# Patient Record
Sex: MALE | Race: WHITE | Employment: PART TIME | URBAN - METROPOLITAN AREA
[De-identification: names, ages, dates, MRNs, and addresses within clinical notes are randomized per-mention and may not be internally consistent; named-entity substitution may affect disease eponyms.]

---

## 2017-01-10 ENCOUNTER — OFFICE VISIT (OUTPATIENT)
Dept: INTERNAL MEDICINE | Facility: CLINIC | Age: 22
End: 2017-01-10
Payer: COMMERCIAL

## 2017-01-10 VITALS
TEMPERATURE: 97.2 F | HEART RATE: 64 BPM | HEIGHT: 73 IN | DIASTOLIC BLOOD PRESSURE: 70 MMHG | OXYGEN SATURATION: 99 % | BODY MASS INDEX: 23.68 KG/M2 | WEIGHT: 178.7 LBS | SYSTOLIC BLOOD PRESSURE: 100 MMHG

## 2017-01-10 DIAGNOSIS — G43.009 ATYPICAL MIGRAINE: Primary | ICD-10-CM

## 2017-01-10 PROCEDURE — 99203 OFFICE O/P NEW LOW 30 MIN: CPT | Performed by: INTERNAL MEDICINE

## 2017-01-10 RX ORDER — SUMATRIPTAN 50 MG/1
50 TABLET, FILM COATED ORAL
Qty: 18 TABLET | Refills: 3 | Status: SHIPPED | OUTPATIENT
Start: 2017-01-10 | End: 2017-01-10

## 2017-01-10 RX ORDER — SUMATRIPTAN 50 MG/1
50 TABLET, FILM COATED ORAL
Qty: 18 TABLET | Refills: 3 | Status: SHIPPED | OUTPATIENT
Start: 2017-01-10

## 2017-01-10 RX ORDER — TOPIRAMATE 100 MG/1
100 TABLET, FILM COATED ORAL AT BEDTIME
Qty: 60 TABLET | Refills: 1 | Status: SHIPPED | OUTPATIENT
Start: 2017-01-10

## 2017-01-10 RX ORDER — VERAPAMIL HYDROCHLORIDE 180 MG/1
180 TABLET, EXTENDED RELEASE ORAL DAILY
COMMUNITY
Start: 2017-01-10

## 2017-01-10 NOTE — PATIENT INSTRUCTIONS
I have placed multiple neurology referrals for you. See who can get you is ASAP.    Please schedule an appointment at your earliest convenience - phone numbers below.    --    Stop Verapamil.    START Topamax (topiramate): 1/2 tab at night for next 2 nights, then increase to full tab at night.    Ideally would take twice a day (100mg tabs) - but may cause grogginess.     ---    Imitrex (sumitriptan) for abortive treatment:    Start as soon as twinge of headache starts. If no improvement in 30 minutes, take a second pill. No more than 2 tablets in 24 hours.

## 2017-01-10 NOTE — PROGRESS NOTES
"  SUBJECTIVE:                                                      HPI: Braulio Butler is a pleasant 21 year old male who presents with migraines:    Accompanied by father.    - has history of atypical migraines    - originally diagnosed with this by neurologist at [facility] several years ago   - started on cyproheptadine - thinks it helped, but has not been on it for years    - migraines seem to be worsening over time  - occur in bouts: daily for 2-3 weeks, then migraine-less for extended period of time (weeks)  - describes migraines as:   - no aura   - frontal in location   - throbbing in quality   - moderate in severity - 5/10   - associated nausea, light-headedness, and dizziness    - occasional vomiting    - describes dizziness as difficulty balance   - no photo- and phono-phobia   - no sinus congestion, rhinitis, eye pain, conjunctival injection, or tearing   - resolve after napping or resting   - minimal/no relief with ibuprofen or Excedrin    - was started on Verapamil ER 120mg at    - noted mild improvement --> dose increased to 180mg ER    - noted mild improvement, but still getting daily migraines    - wears contacts/glasses - last check 5/16    Congenital left-sided hearing loss, otherwise no other active medical problems.  No illicit drug use. Alcohol use on weekends only.   Maternal aunt with migraines.    The medication, allergy, and problem lists have been reviewed and updated as appropriate.     OBJECTIVE:                                                      /70 mmHg  Pulse 64  Temp(Src) 97.2  F (36.2  C) (Oral)  Ht 6' 0.5\" (1.842 m)  Wt 178 lb 11.2 oz (81.058 kg)  BMI 23.89 kg/m2  SpO2 99%  Constitutional: well-appearing  Psych: normal judgment and insight; normal mood and affect; recent and remote memory intact    ASSESSMENT/PLAN:                                                      (G43.009) Atypical migraine  (primary encounter diagnosis)  Comment:    - only mild " improvement with Verapamil - patient is interested in different prophylactic medication.   - recommend trial of abortive therapy as well.   Plan:    - STOP Verapamil.   - START Topamax 50mg qhs --> 100mg qhs --> 100mg bid (if tolerated).   - Imitrex PRN for abortive therapy.   - referred to neurology for further evaluation and management.    - if unable to see neurology, follow-up with me in 4-6 weeks.     The instructions on the AVS were discussed and explained to the patient. Patient expressed understanding of instructions.    Marilyn Beaulieu MD   [address] [address]  T: [phone], F: [phone]

## 2017-01-10 NOTE — MR AVS SNAPSHOT
After Visit Summary   1/10/2017    Braulio Butler    MRN: [id number]           Patient Information     Date Of Birth          1995        Visit Information        Provider Department      1/10/2017 3:00 PM Marilyn Beaulieu MD [facility]        Today's Diagnoses     Atypical migraine    -  1       Care Instructions    I have placed multiple neurology referrals for you. See who can get you is ASAP.    Please schedule an appointment at your earliest convenience - phone numbers below.    --    Stop Verapamil.    START Topamax (topiramate): 1/2 tab at night for next 2 nights, then increase to full tab at night.    Ideally would take twice a day (100mg tabs) - but may cause grogginess.     ---    Imitrex (sumitriptan) for abortive treatment:    Start as soon as twinge of headache starts. If no improvement in 30 minutes, take a second pill. No more than 2 tablets in 24 hours.                Follow-ups after your visit        Additional Services     NEUROLOGY ADULT REFERRAL       Your provider has referred you to: [facility] [place]: [facility] [place] [facility] [phone]   [email or web address]  N: [facility] [phone]   [email or web address]  N: [facility] [phone]   [email or web address]    Reason for Referral: Consult    Please be aware that coverage of these services is subject to the terms and limitations of your health insurance plan.  Call member services at your health plan with any benefit or coverage questions.      Please bring the following with you to your appointment:    (1) Any X-Rays, CTs or MRIs which have been performed.  Contact the facility where they were done to arrange for  prior to your scheduled appointment.    (2) List of current medications  (3) This referral request   (4) Any  "documents/labs given to you for this referral                  Who to contact     If you have questions or need follow up information about today's clinic visit or your schedule please contact [facility] directly at [phone].  Normal or non-critical lab and imaging results will be communicated to you by MyChart, letter or phone within 4 business days after the clinic has received the results. If you do not hear from us within 7 days, please contact the clinic through [facility] or phone. If you have a critical or abnormal lab result, we will notify you by phone as soon as possible.  Submit refill requests through [facility] [employer] or call your pharmacy and they will forward the refill request to us. Please allow 3 business days for your refill to be completed.          Additional Information About Your Visit        [facility] Information     [employer] lets you send messages to your doctor, view your test results, renew your prescriptions, schedule appointments and more. To sign up, go to [email or web address] . Click on \"Log in\" on the left side of the screen, which will take you to the Welcome page. Then click on \"Sign up Now\" on the right side of the page.     You will be asked to enter the access code listed below, as well as some personal information. Please follow the directions to create your username and password.     Your access code is: [email or web address]  Expires: 4/10/2017  3:35 PM     Your access code will  in 90 days. If you need help or a new code, please call your [facility] clinic or [phone].        Care EveryWhere ID     This is your Care EveryWhere ID. This could be used by other organizations to access your [facility] medical records  [id number]        Your Vitals Were     Pulse Temperature Height BMI (Body Mass Index) Pulse Oximetry       64 97.2  F (36.2  C) (Oral) 6' 0.5\" (1.842 m) 23.89 kg/m2 99%        Blood Pressure from Last 3 Encounters:   01/10/17 100/70   13 120/69 "    Weight from Last 3 Encounters:   01/10/17 178 lb 11.2 oz (81.058 kg)   06/01/13 165 lb (74.844 kg) (81.27 %*)     * Growth percentiles are based on [facility] 2-20 Years data.              We Performed the Following     NEUROLOGY ADULT REFERRAL          Today's Medication Changes          These changes are accurate as of: 1/10/17  3:35 PM.  If you have any questions, ask your nurse or doctor.               Start taking these medicines.        Dose/Directions    SUMAtriptan 50 MG tablet   Commonly known as:  IMITREX   Used for:  Atypical migraine   Started by:  Marilyn Beaulieu MD        Dose:  50 mg   Take 1 tablet (50 mg) by mouth at onset of headache for migraine   Quantity:  18 tablet   Refills:  3       topiramate 100 MG tablet   Commonly known as:  TOPAMAX   Used for:  Atypical migraine   Started by:  Marilyn Beaulieu MD        Dose:  100 mg   Take 1 tablet (100 mg) by mouth At Bedtime 1   Quantity:  60 tablet   Refills:  1            Where to get your medicines      These medications were sent to [address] [address]     Phone:  [phone]    - SUMAtriptan 50 MG tablet  - topiramate 100 MG tablet             Primary Care Provider Office Phone # Fax #    Dave Stubbs [phone]       [facility] [address] [address]        Thank you!     Thank you for choosing [facility]  for your care. Our goal is always to provide you with excellent care. Hearing back from our patients is one way we can continue to improve our services. Please take a few minutes to complete the written survey that you may receive in the mail after your visit with us. Thank you!             Your Updated Medication List - Protect others around you: Learn how to safely use, store and throw away your medicines at www.disposemymeds.org.          This list is accurate as of:  1/10/17  3:35 PM.  Always use your most recent med list.                   Brand Name Dispense Instructions for use    SUMAtriptan 50 MG tablet    IMITREX    18 tablet    Take 1 tablet (50 mg) by mouth at onset of headache for migraine       topiramate 100 MG tablet    TOPAMAX    60 tablet    Take 1 tablet (100 mg) by mouth At Bedtime 1       verapamil 180 MG CR tablet    CALAN-SR     Take 1 tablet (180 mg) by mouth daily

## 2017-01-10 NOTE — NURSING NOTE
"Chief Complaint   Patient presents with     Headache     x 2-3 weeks. Been there for several years but gradually worsening along with Dizziness and nausea.       Initial /70 mmHg  Pulse 64  Temp(Src) 97.2  F (36.2  C) (Oral)  Ht 6' 0.5\" (1.842 m)  Wt 178 lb 11.2 oz (81.058 kg)  BMI 23.89 kg/m2  SpO2 99% Estimated body mass index is 23.89 kg/(m^2) as calculated from the following:    Height as of this encounter: 6' 0.5\" (1.842 m).    Weight as of this encounter: 178 lb 11.2 oz (81.058 kg).  BP completed using cuff size: regular    Kaminibose MA      "